# Patient Record
Sex: FEMALE | Race: WHITE | NOT HISPANIC OR LATINO | URBAN - METROPOLITAN AREA
[De-identification: names, ages, dates, MRNs, and addresses within clinical notes are randomized per-mention and may not be internally consistent; named-entity substitution may affect disease eponyms.]

---

## 2017-03-14 ENCOUNTER — PREPPED CHART (OUTPATIENT)
Dept: URBAN - METROPOLITAN AREA CLINIC 39 | Facility: CLINIC | Age: 72
End: 2017-03-14

## 2018-04-27 ASSESSMENT — VISUAL ACUITY
OS_GLARE: 20/400
OD_GLARE: 20/400
OD_SC: 20/100+2
OS_SC: 20/80-1
OD_CC: J1
OD_SC: J12
OS_SC: J<12
OS_CC: J1

## 2018-04-27 ASSESSMENT — TONOMETRY
OD_IOP_MMHG: 14
OS_IOP_MMHG: 16

## 2019-04-02 ENCOUNTER — ESTABLISHED COMPREHENSIVE EXAM (OUTPATIENT)
Dept: URBAN - METROPOLITAN AREA CLINIC 39 | Facility: CLINIC | Age: 74
End: 2019-04-02

## 2019-04-02 DIAGNOSIS — H35.371: ICD-10-CM

## 2019-04-02 DIAGNOSIS — H25.811: ICD-10-CM

## 2019-04-02 DIAGNOSIS — H18.51: ICD-10-CM

## 2019-04-02 DIAGNOSIS — H04.123: ICD-10-CM

## 2019-04-02 DIAGNOSIS — H25.812: ICD-10-CM

## 2019-04-02 PROCEDURE — 92015 DETERMINE REFRACTIVE STATE: CPT

## 2019-04-02 PROCEDURE — 92014 COMPRE OPH EXAM EST PT 1/>: CPT

## 2019-04-02 PROCEDURE — 92134 CPTRZ OPH DX IMG PST SGM RTA: CPT

## 2019-04-02 ASSESSMENT — VISUAL ACUITY
OS_CC: J2
OS_SC: 20/200
OS_SC: <J12
OD_SC: <J12
OD_CC: 20/20-1
OS_CC: 20/20-1
OD_SC: 20/70-1
OD_CC: J1

## 2019-04-02 ASSESSMENT — TONOMETRY
OS_IOP_MMHG: 20
OD_IOP_MMHG: 18

## 2019-10-09 ENCOUNTER — EST. PATIENT EMERGENCY (OUTPATIENT)
Dept: URBAN - METROPOLITAN AREA CLINIC 38 | Facility: CLINIC | Age: 74
End: 2019-10-09

## 2019-10-09 DIAGNOSIS — H04.123: ICD-10-CM

## 2019-10-09 DIAGNOSIS — H10.45: ICD-10-CM

## 2019-10-09 PROCEDURE — 92014 COMPRE OPH EXAM EST PT 1/>: CPT

## 2019-10-09 RX ORDER — LOTEPREDNOL ETABONATE 5 MG/ML: 1 SUSPENSION/ DROPS OPHTHALMIC AS DIRECTED

## 2019-10-09 ASSESSMENT — VISUAL ACUITY
OS_CC: 20/20-1
OU_CC: 20/20
OD_CC: 20/25-1

## 2019-10-09 ASSESSMENT — TONOMETRY
OS_IOP_MMHG: 14
OD_IOP_MMHG: 14

## 2019-11-06 ENCOUNTER — FOLLOW UP (OUTPATIENT)
Dept: URBAN - METROPOLITAN AREA CLINIC 38 | Facility: CLINIC | Age: 74
End: 2019-11-06

## 2019-11-06 DIAGNOSIS — H04.123: ICD-10-CM

## 2019-11-06 DIAGNOSIS — H10.45: ICD-10-CM

## 2019-11-06 PROCEDURE — 92012 INTRM OPH EXAM EST PATIENT: CPT

## 2019-11-06 RX ORDER — OLOPATADINE HYDROCHLORIDE 7 MG/ML: 1 SOLUTION OPHTHALMIC EVERY MORNING

## 2019-11-06 ASSESSMENT — VISUAL ACUITY
OD_CC: 20/25
OS_CC: 20/25

## 2019-11-06 ASSESSMENT — TONOMETRY
OS_IOP_MMHG: 14
OD_IOP_MMHG: 14

## 2020-06-09 ENCOUNTER — ESTABLISHED COMPREHENSIVE EXAM (OUTPATIENT)
Dept: URBAN - METROPOLITAN AREA CLINIC 39 | Facility: CLINIC | Age: 75
End: 2020-06-09

## 2020-06-09 DIAGNOSIS — H01.006: ICD-10-CM

## 2020-06-09 DIAGNOSIS — H18.51: ICD-10-CM

## 2020-06-09 DIAGNOSIS — H04.123: ICD-10-CM

## 2020-06-09 DIAGNOSIS — H43.813: ICD-10-CM

## 2020-06-09 DIAGNOSIS — H25.811: ICD-10-CM

## 2020-06-09 DIAGNOSIS — H35.371: ICD-10-CM

## 2020-06-09 DIAGNOSIS — H10.45: ICD-10-CM

## 2020-06-09 DIAGNOSIS — H01.003: ICD-10-CM

## 2020-06-09 DIAGNOSIS — H25.812: ICD-10-CM

## 2020-06-09 PROCEDURE — 92014 COMPRE OPH EXAM EST PT 1/>: CPT

## 2020-06-09 PROCEDURE — 92015 DETERMINE REFRACTIVE STATE: CPT

## 2020-06-09 PROCEDURE — 92286 ANT SGM IMG I&R SPECLR MIC: CPT

## 2020-06-09 PROCEDURE — 92134 CPTRZ OPH DX IMG PST SGM RTA: CPT

## 2020-06-09 ASSESSMENT — TONOMETRY
OD_IOP_MMHG: 15
OS_IOP_MMHG: 15

## 2020-06-09 ASSESSMENT — VISUAL ACUITY
OD_CC: J1
OD_CC: 20/30+1
OS_CC: J1
OD_BAT: 20/70
OD_SC: >J12
OS_BAT: 20/70
OS_CC: 20/30+2
OS_SC: >J12
OS_SC: 20/200
OD_SC: 20/200+1

## 2020-08-05 NOTE — PATIENT DISCUSSION
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 No diabetic retinopathy found right eye/left eye.  ""

## 2020-10-20 ENCOUNTER — CATARACT CONSULT (OUTPATIENT)
Dept: URBAN - METROPOLITAN AREA CLINIC 39 | Facility: CLINIC | Age: 75
End: 2020-10-20

## 2020-10-20 DIAGNOSIS — H04.123: ICD-10-CM

## 2020-10-20 DIAGNOSIS — H25.811: ICD-10-CM

## 2020-10-20 DIAGNOSIS — H10.45: ICD-10-CM

## 2020-10-20 DIAGNOSIS — H35.371: ICD-10-CM

## 2020-10-20 DIAGNOSIS — H43.813: ICD-10-CM

## 2020-10-20 DIAGNOSIS — H25.812: ICD-10-CM

## 2020-10-20 DIAGNOSIS — H18.513: ICD-10-CM

## 2020-10-20 PROCEDURE — 92286 ANT SGM IMG I&R SPECLR MIC: CPT

## 2020-10-20 PROCEDURE — 92136TC INTERFEROMETRY - TECHNICAL COMPONENT

## 2020-10-20 PROCEDURE — V2799PMN IMPRIMIS PRED-MOXI-NEPAF 5ML

## 2020-10-20 PROCEDURE — 92025-2 CORNEAL TOPOGRAPHY, PT

## 2020-10-20 PROCEDURE — 92250 FUNDUS PHOTOGRAPHY W/I&R: CPT

## 2020-10-20 PROCEDURE — 92134 CPTRZ OPH DX IMG PST SGM RTA: CPT

## 2020-10-20 PROCEDURE — 92014 COMPRE OPH EXAM EST PT 1/>: CPT

## 2020-10-20 ASSESSMENT — VISUAL ACUITY
OS_CC: J3
OS_CC: 20/30-2
OS_SC: J12
OS_BAT: 20/100
OD_SC: J12
OD_CC: 20/40-1
OD_BAT: 20/100
OS_SC: 20/200
OD_CC: J3
OD_SC: 20/200

## 2020-10-20 ASSESSMENT — TONOMETRY
OD_IOP_MMHG: 19
OS_IOP_MMHG: 20

## 2020-11-12 ENCOUNTER — SURGERY/PROCEDURE (OUTPATIENT)
Dept: URBAN - METROPOLITAN AREA CLINIC 39 | Facility: CLINIC | Age: 75
End: 2020-11-12

## 2020-11-12 ENCOUNTER — ESTABLISHED PATIENT (OUTPATIENT)
Dept: URBAN - METROPOLITAN AREA SURGERY 14 | Facility: SURGERY | Age: 75
End: 2020-11-12

## 2020-11-12 DIAGNOSIS — H25.812: ICD-10-CM

## 2020-11-12 DIAGNOSIS — H18.519: ICD-10-CM

## 2020-11-12 DIAGNOSIS — H25.811: ICD-10-CM

## 2020-11-12 DIAGNOSIS — H10.45: ICD-10-CM

## 2020-11-12 PROCEDURE — 99211HP H&P OFFICE/OUTPATIENT VISIT, EST

## 2020-11-12 PROCEDURE — 66984CV REMOVE CATARACT, INSERT LENS, CUSTOM VISION

## 2020-11-12 PROCEDURE — 66999LNSR LENSAR LASER FOR CAT SX

## 2020-11-12 PROCEDURE — 65772LRI LRI DURING CAT SX

## 2020-11-13 ENCOUNTER — CATARACT POST-OP 1-DAY (OUTPATIENT)
Dept: URBAN - METROPOLITAN AREA CLINIC 39 | Facility: CLINIC | Age: 75
End: 2020-11-13

## 2020-11-13 DIAGNOSIS — Z96.1: ICD-10-CM

## 2020-11-13 PROCEDURE — 99024 POSTOP FOLLOW-UP VISIT: CPT

## 2020-11-13 RX ORDER — BRIMONIDINE TARTRATE, TIMOLOL MALEATE 2; 5 MG/ML; MG/ML: 1 SOLUTION/ DROPS OPHTHALMIC TWICE A DAY

## 2020-11-13 ASSESSMENT — VISUAL ACUITY
OS_SC: 20/30-2
OD_SC: 20/70-2

## 2020-11-13 ASSESSMENT — TONOMETRY
OD_IOP_MMHG: 31
OD_IOP_MMHG: 26
OS_IOP_MMHG: 16

## 2020-11-17 ENCOUNTER — POST OP/EVAL OF SECOND EYE (OUTPATIENT)
Dept: URBAN - METROPOLITAN AREA CLINIC 39 | Facility: CLINIC | Age: 75
End: 2020-11-17

## 2020-11-17 DIAGNOSIS — H01.02A: ICD-10-CM

## 2020-11-17 DIAGNOSIS — H01.02B: ICD-10-CM

## 2020-11-17 DIAGNOSIS — H18.513: ICD-10-CM

## 2020-11-17 DIAGNOSIS — H04.123: ICD-10-CM

## 2020-11-17 DIAGNOSIS — H10.45: ICD-10-CM

## 2020-11-17 DIAGNOSIS — Z96.1: ICD-10-CM

## 2020-11-17 DIAGNOSIS — H25.812: ICD-10-CM

## 2020-11-17 PROCEDURE — 92012 INTRM OPH EXAM EST PATIENT: CPT

## 2020-11-17 PROCEDURE — 99024 POSTOP FOLLOW-UP VISIT: CPT

## 2020-11-17 ASSESSMENT — VISUAL ACUITY
OS_BAT: 20/100
OS_SC: 20/200
OS_CC: 20/30-2
OD_SC: 20/25-2

## 2020-11-17 ASSESSMENT — TONOMETRY
OD_IOP_MMHG: 16
OS_IOP_MMHG: 16

## 2020-11-19 ENCOUNTER — SURGERY/PROCEDURE (OUTPATIENT)
Dept: URBAN - METROPOLITAN AREA CLINIC 39 | Facility: CLINIC | Age: 75
End: 2020-11-19

## 2020-11-19 ENCOUNTER — ESTABLISHED PATIENT (OUTPATIENT)
Dept: URBAN - METROPOLITAN AREA SURGERY 14 | Facility: SURGERY | Age: 75
End: 2020-11-19

## 2020-11-19 ENCOUNTER — REFRACTION ONLY (OUTPATIENT)
Dept: URBAN - METROPOLITAN AREA SURGERY 14 | Facility: SURGERY | Age: 75
End: 2020-11-19

## 2020-11-19 DIAGNOSIS — H25.812: ICD-10-CM

## 2020-11-19 DIAGNOSIS — H10.45: ICD-10-CM

## 2020-11-19 DIAGNOSIS — H04.123: ICD-10-CM

## 2020-11-19 DIAGNOSIS — H01.02B: ICD-10-CM

## 2020-11-19 DIAGNOSIS — H18.513: ICD-10-CM

## 2020-11-19 DIAGNOSIS — H01.02A: ICD-10-CM

## 2020-11-19 DIAGNOSIS — Z96.1: ICD-10-CM

## 2020-11-19 PROCEDURE — 65772LRI LRI DURING CAT SX

## 2020-11-19 PROCEDURE — 66984CV REMOVE CATARACT, INSERT LENS, CUSTOM VISION

## 2020-11-19 PROCEDURE — 99211HP H&P OFFICE/OUTPATIENT VISIT, EST

## 2020-11-19 PROCEDURE — 99211T TECH SERVICE

## 2020-11-19 PROCEDURE — 66999LNSR LENSAR LASER FOR CAT SX

## 2020-11-19 ASSESSMENT — VISUAL ACUITY: OD_SC: 20/30+1

## 2020-11-20 ENCOUNTER — CATARACT POST-OP 1-DAY (OUTPATIENT)
Dept: URBAN - METROPOLITAN AREA CLINIC 39 | Facility: CLINIC | Age: 75
End: 2020-11-20

## 2020-11-20 DIAGNOSIS — Z96.1: ICD-10-CM

## 2020-11-20 PROCEDURE — 99024 POSTOP FOLLOW-UP VISIT: CPT

## 2020-11-20 ASSESSMENT — KERATOMETRY
OS_K2POWER_DIOPTERS: 42.75
OD_K1POWER_DIOPTERS: 42.25
OS_K1POWER_DIOPTERS: 42.25
OD_AXISANGLE2_DEGREES: 106
OS_AXISANGLE_DEGREES: 162
OS_AXISANGLE2_DEGREES: 72
OD_K2POWER_DIOPTERS: 42.75
OD_AXISANGLE_DEGREES: 16

## 2020-11-20 ASSESSMENT — TONOMETRY
OS_IOP_MMHG: 18
OD_IOP_MMHG: 16

## 2020-11-20 ASSESSMENT — VISUAL ACUITY
OS_SC: 20/40
OU_SC: 20/20-2
OD_SC: 20/20-2

## 2020-12-23 ENCOUNTER — POST-OP (OUTPATIENT)
Dept: URBAN - METROPOLITAN AREA CLINIC 39 | Facility: CLINIC | Age: 75
End: 2020-12-23

## 2020-12-23 DIAGNOSIS — Z96.1: ICD-10-CM

## 2020-12-23 PROCEDURE — 99024 POSTOP FOLLOW-UP VISIT: CPT

## 2020-12-23 ASSESSMENT — KERATOMETRY
OS_AXISANGLE2_DEGREES: 72
OD_K1POWER_DIOPTERS: 42.25
OS_K2POWER_DIOPTERS: 42.75
OD_K2POWER_DIOPTERS: 42.75
OS_K1POWER_DIOPTERS: 42.25
OS_AXISANGLE_DEGREES: 162
OD_AXISANGLE_DEGREES: 16
OD_AXISANGLE2_DEGREES: 106

## 2020-12-23 ASSESSMENT — VISUAL ACUITY
OS_CC: J1
OS_SC: 20/20
OD_CC: J1
OD_SC: 20/20-1
OD_SC: J8
OS_SC: J6

## 2020-12-23 ASSESSMENT — TONOMETRY
OD_IOP_MMHG: 12
OS_IOP_MMHG: 16

## 2021-01-27 ENCOUNTER — CONSULT (OUTPATIENT)
Dept: URBAN - METROPOLITAN AREA CLINIC 39 | Facility: CLINIC | Age: 76
End: 2021-01-27

## 2021-01-27 DIAGNOSIS — H02.832: ICD-10-CM

## 2021-01-27 DIAGNOSIS — H57.813: ICD-10-CM

## 2021-01-27 DIAGNOSIS — D49.2: ICD-10-CM

## 2021-01-27 DIAGNOSIS — H02.835: ICD-10-CM

## 2021-01-27 DIAGNOSIS — L98.8: ICD-10-CM

## 2021-01-27 DIAGNOSIS — H02.831: ICD-10-CM

## 2021-01-27 DIAGNOSIS — H02.834: ICD-10-CM

## 2021-01-27 PROCEDURE — 92285 EXTERNAL OCULAR PHOTOGRAPHY: CPT

## 2021-01-27 PROCEDURE — 99213 OFFICE O/P EST LOW 20 MIN: CPT

## 2021-01-27 ASSESSMENT — KERATOMETRY
OD_AXISANGLE2_DEGREES: 106
OS_AXISANGLE_DEGREES: 162
OS_AXISANGLE2_DEGREES: 72
OS_K2POWER_DIOPTERS: 42.75
OD_AXISANGLE_DEGREES: 16
OD_K1POWER_DIOPTERS: 42.25
OD_K2POWER_DIOPTERS: 42.75
OS_K1POWER_DIOPTERS: 42.25

## 2021-01-27 ASSESSMENT — VISUAL ACUITY
OD_SC: 20/25-1
OS_SC: 20/20-2

## 2021-02-08 ENCOUNTER — TECH ONLY (OUTPATIENT)
Dept: URBAN - METROPOLITAN AREA CLINIC 39 | Facility: CLINIC | Age: 76
End: 2021-02-08

## 2021-02-08 DIAGNOSIS — H02.834: ICD-10-CM

## 2021-02-08 DIAGNOSIS — H02.831: ICD-10-CM

## 2021-02-08 PROCEDURE — 99211T TECH SERVICE

## 2021-02-08 PROCEDURE — 92082 INTERMEDIATE VISUAL FIELD XM: CPT

## 2021-02-08 ASSESSMENT — KERATOMETRY
OS_K1POWER_DIOPTERS: 42.25
OS_AXISANGLE_DEGREES: 162
OD_K2POWER_DIOPTERS: 42.75
OS_AXISANGLE2_DEGREES: 72
OD_AXISANGLE2_DEGREES: 106
OS_K2POWER_DIOPTERS: 42.75
OD_K1POWER_DIOPTERS: 42.25
OD_AXISANGLE_DEGREES: 16

## 2021-04-12 ENCOUNTER — PRE-OP/H&P (OUTPATIENT)
Dept: URBAN - METROPOLITAN AREA CLINIC 39 | Facility: CLINIC | Age: 76
End: 2021-04-12

## 2021-04-12 DIAGNOSIS — H02.834: ICD-10-CM

## 2021-04-12 DIAGNOSIS — Z96.1: ICD-10-CM

## 2021-04-12 DIAGNOSIS — H02.831: ICD-10-CM

## 2021-04-12 DIAGNOSIS — H02.832: ICD-10-CM

## 2021-04-12 DIAGNOSIS — D49.2: ICD-10-CM

## 2021-04-12 DIAGNOSIS — H57.813: ICD-10-CM

## 2021-04-12 DIAGNOSIS — H04.123: ICD-10-CM

## 2021-04-12 DIAGNOSIS — L98.8: ICD-10-CM

## 2021-04-12 DIAGNOSIS — H02.835: ICD-10-CM

## 2021-04-12 PROCEDURE — 99211HP H&P OFFICE/OUTPATIENT VISIT, EST

## 2021-04-12 RX ORDER — TRAMADOL HCL 50 MG/1
1 TABLET ORAL
Start: 2021-04-27

## 2021-04-12 RX ORDER — ERYTHROMYCIN 5 MG/G
OINTMENT OPHTHALMIC
Start: 2021-04-27

## 2021-04-12 ASSESSMENT — KERATOMETRY
OS_AXISANGLE_DEGREES: 162
OD_K2POWER_DIOPTERS: 42.75
OS_K1POWER_DIOPTERS: 42.25
OD_AXISANGLE2_DEGREES: 106
OD_K1POWER_DIOPTERS: 42.25
OS_AXISANGLE2_DEGREES: 72
OS_K2POWER_DIOPTERS: 42.75
OD_AXISANGLE_DEGREES: 16

## 2021-04-20 ENCOUNTER — ESTABLISHED COMPREHENSIVE EXAM (OUTPATIENT)
Dept: URBAN - METROPOLITAN AREA CLINIC 39 | Facility: CLINIC | Age: 76
End: 2021-04-20

## 2021-04-20 DIAGNOSIS — H18.513: ICD-10-CM

## 2021-04-20 DIAGNOSIS — H35.371: ICD-10-CM

## 2021-04-20 DIAGNOSIS — Z96.1: ICD-10-CM

## 2021-04-20 DIAGNOSIS — H02.835: ICD-10-CM

## 2021-04-20 DIAGNOSIS — H01.02A: ICD-10-CM

## 2021-04-20 DIAGNOSIS — H02.832: ICD-10-CM

## 2021-04-20 DIAGNOSIS — H02.834: ICD-10-CM

## 2021-04-20 DIAGNOSIS — H43.813: ICD-10-CM

## 2021-04-20 DIAGNOSIS — H26.493: ICD-10-CM

## 2021-04-20 DIAGNOSIS — L98.8: ICD-10-CM

## 2021-04-20 DIAGNOSIS — H10.45: ICD-10-CM

## 2021-04-20 DIAGNOSIS — H01.02B: ICD-10-CM

## 2021-04-20 DIAGNOSIS — H02.831: ICD-10-CM

## 2021-04-20 DIAGNOSIS — H04.123: ICD-10-CM

## 2021-04-20 DIAGNOSIS — H40.013: ICD-10-CM

## 2021-04-20 PROCEDURE — 92014 COMPRE OPH EXAM EST PT 1/>: CPT

## 2021-04-20 PROCEDURE — 92015 DETERMINE REFRACTIVE STATE: CPT

## 2021-04-20 PROCEDURE — 92133 CPTRZD OPH DX IMG PST SGM ON: CPT

## 2021-04-20 ASSESSMENT — KERATOMETRY
OS_AXISANGLE2_DEGREES: 72
OS_K1POWER_DIOPTERS: 42.25
OD_AXISANGLE_DEGREES: 16
OD_K2POWER_DIOPTERS: 42.75
OS_AXISANGLE_DEGREES: 75
OS_AXISANGLE2_DEGREES: 165
OS_AXISANGLE_DEGREES: 162
OS_K1POWER_DIOPTERS: 41.25
OD_AXISANGLE2_DEGREES: 106
OS_K2POWER_DIOPTERS: 41.50
OS_K2POWER_DIOPTERS: 42.75
OD_K1POWER_DIOPTERS: 42.25
OD_AXISANGLE_DEGREES: 35
OD_AXISANGLE2_DEGREES: 125

## 2021-04-20 ASSESSMENT — TONOMETRY
OD_IOP_MMHG: 10
OS_IOP_MMHG: 11

## 2021-04-20 ASSESSMENT — VISUAL ACUITY
OD_CC: J1+
OU_SC: 20/15-1
OS_SC: J2
OD_CC: 20/20-1
OU_CC: J1+
OU_SC: J2
OD_SC: 20/20-1
OS_SC: 20/15-1
OD_SC: J5
OS_CC: 20/15
OU_CC: 20/15-1
OS_CC: J1+

## 2021-04-27 ENCOUNTER — SURGERY/PROCEDURE (OUTPATIENT)
Dept: URBAN - METROPOLITAN AREA SURGERY 14 | Facility: SURGERY | Age: 76
End: 2021-04-27

## 2021-04-27 ENCOUNTER — PRE-OP/H&P (OUTPATIENT)
Dept: URBAN - METROPOLITAN AREA SURGERY 14 | Facility: SURGERY | Age: 76
End: 2021-04-27

## 2021-04-27 DIAGNOSIS — D49.2: ICD-10-CM

## 2021-04-27 DIAGNOSIS — H02.834: ICD-10-CM

## 2021-04-27 DIAGNOSIS — H02.831: ICD-10-CM

## 2021-04-27 PROCEDURE — 1582350 UPPER BLEPH PER EYE FUNCTIONAL-BILATERAL

## 2021-04-27 PROCEDURE — 99499 UNLISTED E&M SERVICE: CPT

## 2021-04-27 PROCEDURE — 67840 REMOVE EYELID LESION: CPT

## 2021-04-27 ASSESSMENT — KERATOMETRY
OS_AXISANGLE_DEGREES: 162
OD_K1POWER_DIOPTERS: 42.25
OD_AXISANGLE_DEGREES: 16
OS_AXISANGLE2_DEGREES: 72
OD_K2POWER_DIOPTERS: 42.75
OD_AXISANGLE2_DEGREES: 106
OS_K2POWER_DIOPTERS: 42.75
OS_K1POWER_DIOPTERS: 42.25

## 2021-04-28 ASSESSMENT — KERATOMETRY
OS_K1POWER_DIOPTERS: 42.25
OS_K2POWER_DIOPTERS: 42.75
OD_AXISANGLE2_DEGREES: 106
OS_AXISANGLE2_DEGREES: 72
OS_AXISANGLE_DEGREES: 162
OD_K1POWER_DIOPTERS: 42.25
OD_AXISANGLE_DEGREES: 16
OD_K2POWER_DIOPTERS: 42.75

## 2021-05-05 ENCOUNTER — TECH ONLY (OUTPATIENT)
Dept: URBAN - METROPOLITAN AREA CLINIC 39 | Facility: CLINIC | Age: 76
End: 2021-05-05

## 2021-05-05 DIAGNOSIS — H57.813: ICD-10-CM

## 2021-05-05 DIAGNOSIS — H04.123: ICD-10-CM

## 2021-05-05 DIAGNOSIS — H02.832: ICD-10-CM

## 2021-05-05 DIAGNOSIS — Z98.890: ICD-10-CM

## 2021-05-05 DIAGNOSIS — D49.2: ICD-10-CM

## 2021-05-05 DIAGNOSIS — Z96.1: ICD-10-CM

## 2021-05-05 DIAGNOSIS — L98.8: ICD-10-CM

## 2021-05-05 DIAGNOSIS — H02.835: ICD-10-CM

## 2021-05-05 DIAGNOSIS — H02.834: ICD-10-CM

## 2021-05-05 DIAGNOSIS — H02.831: ICD-10-CM

## 2021-05-05 PROCEDURE — 99211T TECH SERVICE

## 2021-05-05 RX ORDER — BIMATOPROST 0.3 MG/ML: 1 SOLUTION/ DROPS OPHTHALMIC EVERY EVENING

## 2021-05-05 ASSESSMENT — KERATOMETRY
OD_K1POWER_DIOPTERS: 42.25
OD_K2POWER_DIOPTERS: 42.75
OS_K2POWER_DIOPTERS: 42.75
OS_K1POWER_DIOPTERS: 42.25
OS_AXISANGLE_DEGREES: 162
OD_AXISANGLE_DEGREES: 16
OS_AXISANGLE2_DEGREES: 72
OD_AXISANGLE2_DEGREES: 106

## 2021-05-05 ASSESSMENT — VISUAL ACUITY
OD_SC: 20/25-2
OS_SC: 20/25-1

## 2022-04-20 ENCOUNTER — COMPREHENSIVE EXAM (OUTPATIENT)
Dept: URBAN - METROPOLITAN AREA CLINIC 39 | Facility: CLINIC | Age: 77
End: 2022-04-20

## 2022-04-20 DIAGNOSIS — H01.02B: ICD-10-CM

## 2022-04-20 DIAGNOSIS — H01.02A: ICD-10-CM

## 2022-04-20 DIAGNOSIS — H10.45: ICD-10-CM

## 2022-04-20 DIAGNOSIS — H40.013: ICD-10-CM

## 2022-04-20 DIAGNOSIS — Z98.890: ICD-10-CM

## 2022-04-20 DIAGNOSIS — H43.813: ICD-10-CM

## 2022-04-20 DIAGNOSIS — H35.371: ICD-10-CM

## 2022-04-20 DIAGNOSIS — H04.123: ICD-10-CM

## 2022-04-20 DIAGNOSIS — H18.513: ICD-10-CM

## 2022-04-20 DIAGNOSIS — Z96.1: ICD-10-CM

## 2022-04-20 PROCEDURE — 92015 DETERMINE REFRACTIVE STATE: CPT

## 2022-04-20 PROCEDURE — 92133 CPTRZD OPH DX IMG PST SGM ON: CPT

## 2022-04-20 PROCEDURE — 92014 COMPRE OPH EXAM EST PT 1/>: CPT

## 2022-04-20 ASSESSMENT — VISUAL ACUITY
OS_SC: J3
OS_CC: J1+
OS_SC: 20/25
OU_SC: 20/20-1
OU_CC: J1+
OD_SC: J5
OU_SC: J3
OD_SC: 20/25-1
OD_CC: J1+

## 2022-04-20 ASSESSMENT — KERATOMETRY
OS_AXISANGLE2_DEGREES: 72
OD_AXISANGLE2_DEGREES: 106
OS_K2POWER_DIOPTERS: 42.75
OD_K2POWER_DIOPTERS: 42.75
OS_K1POWER_DIOPTERS: 42.25
OD_K1POWER_DIOPTERS: 42.25
OD_AXISANGLE_DEGREES: 16
OS_AXISANGLE_DEGREES: 162

## 2022-04-20 ASSESSMENT — TONOMETRY
OS_IOP_MMHG: 12
OD_IOP_MMHG: 12

## 2023-04-20 ENCOUNTER — COMPREHENSIVE EXAM (OUTPATIENT)
Dept: URBAN - METROPOLITAN AREA CLINIC 39 | Facility: CLINIC | Age: 78
End: 2023-04-20

## 2023-04-20 DIAGNOSIS — D49.2: ICD-10-CM

## 2023-04-20 DIAGNOSIS — H04.123: ICD-10-CM

## 2023-04-20 DIAGNOSIS — H10.45: ICD-10-CM

## 2023-04-20 DIAGNOSIS — H57.813: ICD-10-CM

## 2023-04-20 DIAGNOSIS — H01.02A: ICD-10-CM

## 2023-04-20 DIAGNOSIS — L98.8: ICD-10-CM

## 2023-04-20 DIAGNOSIS — H02.832: ICD-10-CM

## 2023-04-20 DIAGNOSIS — H02.835: ICD-10-CM

## 2023-04-20 DIAGNOSIS — H18.513: ICD-10-CM

## 2023-04-20 DIAGNOSIS — H02.831: ICD-10-CM

## 2023-04-20 DIAGNOSIS — H02.834: ICD-10-CM

## 2023-04-20 DIAGNOSIS — H43.813: ICD-10-CM

## 2023-04-20 DIAGNOSIS — H35.371: ICD-10-CM

## 2023-04-20 DIAGNOSIS — Z98.890: ICD-10-CM

## 2023-04-20 DIAGNOSIS — Z96.1: ICD-10-CM

## 2023-04-20 DIAGNOSIS — H01.02B: ICD-10-CM

## 2023-04-20 DIAGNOSIS — H40.013: ICD-10-CM

## 2023-04-20 PROCEDURE — A4262 TEMPORARY TEAR DUCT PLUG: HCPCS

## 2023-04-20 PROCEDURE — 92014 COMPRE OPH EXAM EST PT 1/>: CPT

## 2023-04-20 PROCEDURE — 92133 CPTRZD OPH DX IMG PST SGM ON: CPT

## 2023-04-20 PROCEDURE — 68761Q PUNCTAL PLUG/QUINTESS DISSOLVABLE PLUG/EACH

## 2023-04-20 PROCEDURE — 92015 DETERMINE REFRACTIVE STATE: CPT

## 2023-04-20 ASSESSMENT — KERATOMETRY
OS_K2POWER_DIOPTERS: 42.75
OS_AXISANGLE2_DEGREES: 72
OD_AXISANGLE_DEGREES: 16
OD_K2POWER_DIOPTERS: 42.75
OD_AXISANGLE2_DEGREES: 106
OS_K1POWER_DIOPTERS: 42.25
OS_AXISANGLE_DEGREES: 162
OD_K1POWER_DIOPTERS: 42.25

## 2023-04-20 ASSESSMENT — TONOMETRY
OS_IOP_MMHG: 18
OD_IOP_MMHG: 18

## 2023-04-20 ASSESSMENT — VISUAL ACUITY
OD_CC: J1
OS_SC: 20/20-1
OD_SC: 20/25+1
OU_SC: 20/20
OD_SC: J5
OS_CC: J1
OS_SC: J5

## 2023-10-23 ENCOUNTER — FOLLOW UP (OUTPATIENT)
Dept: URBAN - METROPOLITAN AREA CLINIC 39 | Facility: CLINIC | Age: 78
End: 2023-10-23

## 2023-10-23 DIAGNOSIS — H16.223: ICD-10-CM

## 2023-10-23 DIAGNOSIS — H01.02A: ICD-10-CM

## 2023-10-23 DIAGNOSIS — H01.02B: ICD-10-CM

## 2023-10-23 DIAGNOSIS — H40.013: ICD-10-CM

## 2023-10-23 PROCEDURE — 92012 INTRM OPH EXAM EST PATIENT: CPT | Mod: 25

## 2023-10-23 PROCEDURE — 68761Q PUNCTAL PLUG/QUINTESS DISSOLVABLE PLUG/EACH: Mod: E2,E4

## 2023-10-23 PROCEDURE — 92083 EXTENDED VISUAL FIELD XM: CPT

## 2023-10-23 PROCEDURE — A4262 TEMPORARY TEAR DUCT PLUG: HCPCS | Mod: E2,E4

## 2023-10-23 RX ORDER — BIMATOPROST 0.3 MG/ML: 1 SOLUTION/ DROPS OPHTHALMIC

## 2023-10-23 RX ORDER — LIFITEGRAST 50 MG/ML: 1 SOLUTION/ DROPS OPHTHALMIC TWICE A DAY

## 2023-10-23 ASSESSMENT — KERATOMETRY
OS_K2POWER_DIOPTERS: 42.75
OD_AXISANGLE2_DEGREES: 106
OS_AXISANGLE_DEGREES: 162
OS_AXISANGLE2_DEGREES: 72
OD_AXISANGLE_DEGREES: 16
OD_K2POWER_DIOPTERS: 42.75
OD_K1POWER_DIOPTERS: 42.25
OS_K1POWER_DIOPTERS: 42.25

## 2023-10-23 ASSESSMENT — TONOMETRY
OD_IOP_MMHG: 17
OS_IOP_MMHG: 18

## 2023-10-23 ASSESSMENT — VISUAL ACUITY
OD_SC: 20/20-1
OU_SC: 20/15-2
OS_SC: 20/20-1

## 2024-10-23 ENCOUNTER — COMPREHENSIVE EXAM (OUTPATIENT)
Dept: URBAN - METROPOLITAN AREA CLINIC 39 | Facility: CLINIC | Age: 79
End: 2024-10-23

## 2024-10-23 DIAGNOSIS — H01.02B: ICD-10-CM

## 2024-10-23 DIAGNOSIS — H43.813: ICD-10-CM

## 2024-10-23 DIAGNOSIS — H18.513: ICD-10-CM

## 2024-10-23 DIAGNOSIS — Z96.1: ICD-10-CM

## 2024-10-23 DIAGNOSIS — H16.223: ICD-10-CM

## 2024-10-23 DIAGNOSIS — H01.02A: ICD-10-CM

## 2024-10-23 DIAGNOSIS — H40.013: ICD-10-CM

## 2024-10-23 DIAGNOSIS — H10.45: ICD-10-CM

## 2024-10-23 PROCEDURE — A4262 TEMPORARY TEAR DUCT PLUG: HCPCS | Mod: E2,E4

## 2024-10-23 PROCEDURE — 92014 COMPRE OPH EXAM EST PT 1/>: CPT | Mod: 25

## 2024-10-23 PROCEDURE — 68761Q PUNCTAL PLUG/QUINTESS DISSOLVABLE PLUG/EACH: Mod: E2,E4

## 2024-10-23 PROCEDURE — 92133 CPTRZD OPH DX IMG PST SGM ON: CPT

## 2025-04-22 ENCOUNTER — FOLLOW UP (OUTPATIENT)
Age: 80
End: 2025-04-22

## 2025-04-22 DIAGNOSIS — H01.02A: ICD-10-CM

## 2025-04-22 DIAGNOSIS — H16.223: ICD-10-CM

## 2025-04-22 DIAGNOSIS — H01.02B: ICD-10-CM

## 2025-04-22 PROCEDURE — A4262 TEMPORARY TEAR DUCT PLUG: HCPCS | Mod: E2,E4

## 2025-04-22 PROCEDURE — 92012 INTRM OPH EXAM EST PATIENT: CPT | Mod: 25

## 2025-04-22 PROCEDURE — 68761Q PUNCTAL PLUG/QUINTESS DISSOLVABLE PLUG/EACH: Mod: E2,E4
